# Patient Record
Sex: MALE | Race: WHITE | NOT HISPANIC OR LATINO | URBAN - METROPOLITAN AREA
[De-identification: names, ages, dates, MRNs, and addresses within clinical notes are randomized per-mention and may not be internally consistent; named-entity substitution may affect disease eponyms.]

---

## 2022-10-04 ENCOUNTER — OFFICE VISIT (OUTPATIENT)
Dept: OTOLARYNGOLOGY | Facility: CLINIC | Age: 82
End: 2022-10-04
Payer: OTHER GOVERNMENT

## 2022-10-04 ENCOUNTER — CLINICAL SUPPORT (OUTPATIENT)
Dept: AUDIOLOGY | Facility: CLINIC | Age: 82
End: 2022-10-04
Payer: OTHER GOVERNMENT

## 2022-10-04 VITALS — WEIGHT: 185 LBS

## 2022-10-04 DIAGNOSIS — H81.02 MENIERE'S DISEASE, LEFT: ICD-10-CM

## 2022-10-04 DIAGNOSIS — H91.22 SUDDEN IDIOPATHIC HEARING LOSS OF LEFT EAR WITH RESTRICTED HEARING OF RIGHT EAR: Primary | ICD-10-CM

## 2022-10-04 DIAGNOSIS — R42 VERTIGO: ICD-10-CM

## 2022-10-04 DIAGNOSIS — R42 DIZZINESS: ICD-10-CM

## 2022-10-04 DIAGNOSIS — H93.13 TINNITUS, BILATERAL: ICD-10-CM

## 2022-10-04 DIAGNOSIS — H90.3 ASYMMETRICAL SENSORINEURAL HEARING LOSS: Primary | ICD-10-CM

## 2022-10-04 DIAGNOSIS — H81.92 VESTIBULOPATHY, LEFT: ICD-10-CM

## 2022-10-04 PROBLEM — H90.A22 SENSORINEURAL HEARING LOSS (SNHL) OF LEFT EAR WITH RESTRICTED HEARING OF RIGHT EAR: Status: ACTIVE | Noted: 2022-10-04

## 2022-10-04 PROCEDURE — 99203 OFFICE O/P NEW LOW 30 MIN: CPT | Mod: PBBFAC,27 | Performed by: OTOLARYNGOLOGY

## 2022-10-04 PROCEDURE — 92557 COMPREHENSIVE HEARING TEST: CPT | Mod: PBBFAC | Performed by: AUDIOLOGIST

## 2022-10-04 PROCEDURE — 99211 OFF/OP EST MAY X REQ PHY/QHP: CPT | Mod: PBBFAC | Performed by: AUDIOLOGIST

## 2022-10-04 PROCEDURE — 99999 PR PBB SHADOW E&M-NEW PATIENT-LVL III: CPT | Mod: PBBFAC,,, | Performed by: OTOLARYNGOLOGY

## 2022-10-04 PROCEDURE — 92567 TYMPANOMETRY: CPT | Mod: PBBFAC | Performed by: AUDIOLOGIST

## 2022-10-04 PROCEDURE — 99999 PR PBB SHADOW E&M-EST. PATIENT-LVL I: CPT | Mod: PBBFAC,,, | Performed by: AUDIOLOGIST

## 2022-10-04 PROCEDURE — 99204 OFFICE O/P NEW MOD 45 MIN: CPT | Mod: S$PBB,,, | Performed by: OTOLARYNGOLOGY

## 2022-10-04 PROCEDURE — 99999 PR PBB SHADOW E&M-EST. PATIENT-LVL I: ICD-10-PCS | Mod: PBBFAC,,, | Performed by: AUDIOLOGIST

## 2022-10-04 PROCEDURE — 99204 PR OFFICE/OUTPT VISIT, NEW, LEVL IV, 45-59 MIN: ICD-10-PCS | Mod: S$PBB,,, | Performed by: OTOLARYNGOLOGY

## 2022-10-04 PROCEDURE — 99999 PR PBB SHADOW E&M-NEW PATIENT-LVL III: ICD-10-PCS | Mod: PBBFAC,,, | Performed by: OTOLARYNGOLOGY

## 2022-10-04 RX ORDER — LOSARTAN POTASSIUM 100 MG/1
100 TABLET ORAL
COMMUNITY
Start: 2021-11-30

## 2022-10-04 RX ORDER — DESONIDE 0.5 MG/G
CREAM TOPICAL
COMMUNITY

## 2022-10-04 RX ORDER — KETOCONAZOLE 20 MG/ML
SHAMPOO, SUSPENSION TOPICAL
COMMUNITY

## 2022-10-04 RX ORDER — TRIAMTERENE AND HYDROCHLOROTHIAZIDE 75; 50 MG/1; MG/1
0.5 TABLET ORAL DAILY
COMMUNITY
Start: 2022-05-04

## 2022-10-04 RX ORDER — MECLIZINE HCL 25MG 25 MG/1
25 TABLET, CHEWABLE ORAL
COMMUNITY
Start: 2021-12-29

## 2022-10-04 RX ORDER — BACLOFEN 10 MG/1
TABLET ORAL
COMMUNITY

## 2022-10-04 RX ORDER — HYDROCODONE BITARTRATE AND ACETAMINOPHEN 7.5; 325 MG/1; MG/1
1 TABLET ORAL 3 TIMES DAILY PRN
COMMUNITY
Start: 2022-09-02

## 2022-10-04 RX ORDER — CHLORTHALIDONE 25 MG/1
TABLET ORAL
COMMUNITY

## 2022-10-04 RX ORDER — FINASTERIDE 5 MG/1
5 TABLET, FILM COATED ORAL
COMMUNITY
Start: 2022-07-26

## 2022-10-04 RX ORDER — BETAMETHASONE DIPROPIONATE 0.5 MG/G
CREAM TOPICAL
COMMUNITY
Start: 2022-08-23

## 2022-10-04 NOTE — PROGRESS NOTES
"Subjective:   Pascual Meyer is a 82 y.o. male who was  referred to me by his ENT Dr. Naranjo  for dizziness.  Patient reports sudden hearing loss and vertigo in June 2021.  Since then, he reports dysequilibrium and now uses a cane to ambulate.  He reports 12 episodes of "room spinning" vertigo over the last 16 months.  Episodes last 30 mins-1 hour and usually while he is in bed, but sometimes with head motion.  He had a long history of noise exposure ( of the [employer]) and had hearing loss prior to the event last year.  Patient also as a prosthetic R eye.  Patient has been taking diuretic for possible Meniere's, but has yet to start vestibular therapy. He denies a family history of any dizzy disorders.Extensive workup completed by Dr. Naranjo, per patient.  Patient reports abnormal VNG/caloric testing.    Past Medical History  He has no past medical history on file.    Past Surgical History  He has no past surgical history on file.    Family History  His family history is not on file.    Social History  He     Allergies  He is allergic to ace inhibitors.    Medications  He has a current medication list which includes the following prescription(s): baclofen, betamethasone dipropionate, chlorthalidone, desonide, finasteride, hydrocodone-acetaminophen, ketoconazole, losartan, meclizine, and triamterene-hydrochlorothiazide 75-50 mg.    Review of Systems   HENT: Positive for hearing loss.  Negative for ear infection, ear pain and ringing in the ears.      Musc: Positive for back pain (chronic back and neck issues) and neck pain.     Neurological: Positive for dizziness. (+) off balance sensation      Wt 83.9 kg (185 lb)      Constitutional:   Vital signs are normal. He appears well-developed and well-nourished.   Prosthetic OD     Head:  Normocephalic and atraumatic.     Ears:    Right Ear: No lacerations. No drainage, swelling or tenderness. No foreign bodies. No mastoid tenderness. Tympanic membrane is not injected, " not scarred, not perforated, not retracted and not bulging. No middle ear effusion.   Left Ear: No lacerations. No drainage, swelling or tenderness. No foreign bodies. No mastoid tenderness. Tympanic membrane is not injected, not scarred, not perforated, not retracted and not bulging.  No middle ear effusion.       Procedure  None    Data Reviewed  No results found for: WBC  No results found for: PLT   No results found for: CREATININE  No results found for: TSH  No results found for: GLU  No results found for: HGBA1C    Audiogram      I independently reviewed the tracings of the complete audiometric evaluation performed today.  I reviewed the audiogram with the patient as well.  Pertinent findings include  SNHL AS>AD .  Assessment:     1. 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 Vertigo      Plan:   Pascual was seen today for dizziness.    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

## 2022-10-04 NOTE — PROGRESS NOTES
Audiologic Evaluation 10/4/2022:       Pascual Meyer, a 82 y.o. male, was seen today in the clinic for an audiologic evaluation for vertigo and hearing loss. Mr. Meyer was referred to Dr. Mcgrath by the [facility] and has been previously diagnosed with Meniere's disease. Mr. Meyer reported a sudden decrease in hearing in the left ear in June of 2021. Mr. Meyer reported he had hearing loss in both ears before the sudden change in hearing, but now the left ear is much worse. Mr. Meyer reported 12 episodes of room-spinning vertigo in the past year that typically last about an hour. Mr. Meyer reported he feels very unsteady and has fallen. Mr. Meyer wears binaural ITC hearing aids [facility] [place]. Mr. Meyer reported bilateral tinnitus for the past 50 years. He denied otalgia.     Tympanometry revealed Type A tympanogram in the right ear and Type A tympanogram in the left ear. Audiogram results revealed moderate sloping to profound sensorineural hearing loss in the right ear and severe sensorineural hearing loss in the left ear.  Speech reception thresholds were noted at 55 dB in the right ear and 75 dB in the left ear.  Speech discrimination scores were 72% in the right ear and 40% in the left ear.    Recommendations:  Otologic evaluation  Continued use of hearing aids  Annual audiogram  Hearing protection when in noise

## 2022-10-04 NOTE — LETTER
October 5, 2022        Bakari Naranjo MD  [address]  [facility] [place] [address]             [facility] [address]  [address]  [facility] [address]  Phone: [phone]  Fax: [phone]   Patient: Pascual Meyer   MR Number: [id number]   YOB: 1940   Date of Visit: 10/4/2022       Dear Dr. Naranjo:    Thank you for referring Pascual Meyer to me for evaluation. Attached you will find relevant portions of my assessment and plan of care.    If you have questions, please do not hesitate to call me. I look forward to following Pascual Meyer along with you.    Sincerely,      Sammy Mcgrath MD            CC  No Recipients    Enclosure